# Patient Record
Sex: MALE | Race: WHITE | HISPANIC OR LATINO | Employment: FULL TIME | URBAN - METROPOLITAN AREA
[De-identification: names, ages, dates, MRNs, and addresses within clinical notes are randomized per-mention and may not be internally consistent; named-entity substitution may affect disease eponyms.]

---

## 2023-04-07 LAB — SARS-COV-2 RESULT: NOT DETECTED

## 2023-12-15 ENCOUNTER — HOSPITAL ENCOUNTER (OUTPATIENT)
Dept: NEUROLOGY | Facility: HOSPITAL | Age: 33
Discharge: HOME | End: 2023-12-15
Payer: COMMERCIAL

## 2023-12-15 DIAGNOSIS — G56.00 CARPAL TUNNEL SYNDROME, UNSPECIFIED UPPER LIMB: ICD-10-CM

## 2023-12-15 PROCEDURE — 95911 NRV CNDJ TEST 9-10 STUDIES: CPT

## 2023-12-17 NOTE — PROCEDURES
Electromyography Laboratory Report       Accreditation with Exemplary Status         Name VAZQUEZ FLEMING MRN [id number] Ref Provider DALILA GARCIA Technologist Fred   Gender Male Age 33 Years EDX Physician Lena Steinp ºC 33    1990 Height 5 feet 9 inch Order No [id number] Study Date 12/15/2023 11:11 AM      Reason for Referral:   Carpal Tunnel Syndrome (CTS),     VAZQUEZ FLEMING [id number] 12/15/2023 11:11 AM     2 of 3        Motor Nerve Conduction Study       Nerve/Sites Muscle Amplitude Latency Velocity F min     mV ms m/s ms     Right Left Ref. Right Left Ref. Right Left Ref. Right Left Ref.   Median - APB      Wrist APB 12.4 14.4 >=6.0 6.6 5.5 <=3.9    31.2 30.9 <=30.9      AC Fossa APB 11.0 13.8  11.5 9.8  49.8 53.3 >=50.0      Ulnar - ADM      Wrist ADM 14.1  >=7.0 3.1  <=3.1    28.1  <=30.2      B. Elbow ADM 12.8   6.4   57.0  >=50.0         A. Elbow ADM 13.9   8.1   58.5  >=50.0          Sensory Nerve Conduction Study       Nerve/Sites Rec. Site Amplitude Peak Lat Velocity     µV ms m/s     Right Left Ref. Right Left Ref. Right Left Ref.   Median      Wrist Index 9.6 18.8 >=20.0 6.3 5.1 <=3.4 33.0 40.1 >=50.0   Ulnar      Wrist Little 28.1  >=12.0 3.9  <=3.1 48.9  >=50.0   Radial      Forearm Snuff Box 16.0  >=18.0 2.7  <=2.7 54.9  >=50.0   Median, Ulnar - Palmar Mixed      Median  Palm Med Wr 10.6 10.0  4.0 3.2 <=2.2 25.6 30.7 >=49.0      Ulnar Palm Uln Wr 23.5   1.9  <=2.2 56.9  >=49.0                      EMG Summary Table     Spontaneous Voluntary Activity   Muscle Nerve Roots Ins Act Fibs/PSW Fasc Other Amp Dur Phases Recruitment Activation Notes   R. APB - Abd Poll Brev Median C8-T1 NL 0 0 - NL +2 NL Early NL -   R. PT - Pron Teres Median C6-C7 NL 0 0 - NL NL NL NL NL -   R. FDI - First Dors Int Ulnar C8-T1 NL 0 0 - NL NL NL NL NL -   R. FCU - Flex Carp Uln Ulnar C7-T1 NL 0 0 - NL NL NL NL NL -   R. MD - Deltoid (med) Axillary C5-C6 NL 0 0 - NL NL NL NL NL -   R. BB - Biceps Brach Musculocutaneous  C5-C6 NL 0 0 - NL NL NL NL NL -   R. TB - Triceps Brach Radial C6-C8 NL 0 0 - NL NL NL NL NL -   R. Cervical psp (low)  - NL 0 0 - NL NL NL NL NL -   R. Cervical psp (mid)  - NL 0 0 - NL NL NL NL NL -   R. Cervical psp (up)  - NL 0 0 - NL NL NL NL NL -   L. APB - Abd Poll Brev Median C8-T1 NL 0 0 - -1 +1 NL NL NL -   L. PT - Pron Teres Median C6-C7 NL 0 0 - NL NL NL NL NL -   L. FDI - First Dors Int Ulnar C8-T1 NL 0 0 - NL NL NL NL NL -   L. FCU - Flex Carp Uln Ulnar C7-T1 NL 0 0 - NL NL NL NL NL -   L. MD - Deltoid (med) Axillary C5-C6 NL 0 0 - NL NL NL NL NL -   L. BB - Biceps Brach Musculocutaneous C5-C6 NL 0 0 - NL NL NL NL NL -   L. TB - Triceps Brach Radial C6-C8 NL 0 0 - NL NL NL NL NL -   L. Cervical psp (low)  - NL 0 0 - NL NL NL NL NL -   L. Cervical psp (mid)  - NL 0 0 - NL NL NL NL NL -   L. Cervical psp (up)  - NL 0 0 - NL NL NL NL NL -               NL = Normal, Inc = Increased, Dec = Decreased, CRD = Complex Repetitive Discharge; SL = Slightly Decreased; MO = Moderately Decreased; MK = Markedly Decreased; N/+1, +1, +2, +3 = Borderline, Slightly, Moderately, Markedly Increased; N/-1, -1, -2, -3 = Borderline, Slightly, Moderately, Markedly Decreased, N/A = Not Applicable    Summary:         Impression:   This study reveals ENMG evidence of a neuropathic process affecting the median nerves at or below the wrists bilaterally, consistent with the clinical diagnosis of bilateral carpal tunnel syndrome. This is of moderate degree on the R and mild degree on the L by electrical criteria.   Nocturnal splint use was recommended to the patient.   There is no suggestion by this study of more proximal processes e.g. radiculopathy, nor of more widespread processes e.g. peripheral neuropathy or mononeuritis multiplex.

## 2023-12-22 ENCOUNTER — OFFICE VISIT (OUTPATIENT)
Dept: ORTHOPEDIC SURGERY | Facility: CLINIC | Age: 33
End: 2023-12-22
Payer: COMMERCIAL

## 2023-12-22 VITALS — BODY MASS INDEX: 37.77 KG/M2 | WEIGHT: 255 LBS | HEIGHT: 69 IN

## 2023-12-22 DIAGNOSIS — G56.03 BILATERAL CARPAL TUNNEL SYNDROME: Primary | ICD-10-CM

## 2023-12-22 PROCEDURE — 3008F BODY MASS INDEX DOCD: CPT | Performed by: STUDENT IN AN ORGANIZED HEALTH CARE EDUCATION/TRAINING PROGRAM

## 2023-12-22 PROCEDURE — 2500000004 HC RX 250 GENERAL PHARMACY W/ HCPCS (ALT 636 FOR OP/ED): Performed by: STUDENT IN AN ORGANIZED HEALTH CARE EDUCATION/TRAINING PROGRAM

## 2023-12-22 PROCEDURE — 99214 OFFICE O/P EST MOD 30 MIN: CPT | Performed by: STUDENT IN AN ORGANIZED HEALTH CARE EDUCATION/TRAINING PROGRAM

## 2023-12-22 PROCEDURE — 2500000005 HC RX 250 GENERAL PHARMACY W/O HCPCS: Performed by: STUDENT IN AN ORGANIZED HEALTH CARE EDUCATION/TRAINING PROGRAM

## 2023-12-22 RX ORDER — MONTELUKAST SODIUM 10 MG/1
10 TABLET ORAL NIGHTLY
COMMUNITY
Start: 2023-12-08

## 2023-12-22 RX ORDER — ALBUTEROL SULFATE 0.83 MG/ML
2.5 SOLUTION RESPIRATORY (INHALATION) EVERY 6 HOURS PRN
COMMUNITY
Start: 2023-12-07

## 2023-12-22 RX ORDER — FLUTICASONE PROPIONATE 110 UG/1
1 AEROSOL, METERED RESPIRATORY (INHALATION) 2 TIMES DAILY
COMMUNITY
Start: 2023-12-01

## 2023-12-22 RX ORDER — BETAMETHASONE SODIUM PHOSPHATE AND BETAMETHASONE ACETATE 3; 3 MG/ML; MG/ML
6 INJECTION, SUSPENSION INTRA-ARTICULAR; INTRALESIONAL; INTRAMUSCULAR; SOFT TISSUE
Status: COMPLETED | OUTPATIENT
Start: 2023-12-22 | End: 2023-12-22

## 2023-12-22 RX ORDER — LIDOCAINE HYDROCHLORIDE 10 MG/ML
1 INJECTION INFILTRATION; PERINEURAL
Status: COMPLETED | OUTPATIENT
Start: 2023-12-22 | End: 2023-12-22

## 2023-12-22 RX ADMIN — BETAMETHASONE ACETATE AND BETAMETHASONE SODIUM PHOSPHATE 6 MG: 3; 3 INJECTION, SUSPENSION INTRA-ARTICULAR; INTRALESIONAL; INTRAMUSCULAR; SOFT TISSUE at 12:53

## 2023-12-22 RX ADMIN — LIDOCAINE HYDROCHLORIDE 1 ML: 10 INJECTION, SOLUTION INFILTRATION; PERINEURAL at 12:53

## 2023-12-22 ASSESSMENT — PAIN - FUNCTIONAL ASSESSMENT: PAIN_FUNCTIONAL_ASSESSMENT: 0-10

## 2023-12-22 ASSESSMENT — PAIN SCALES - GENERAL: PAINLEVEL_OUTOF10: 0 - NO PAIN

## 2023-12-22 NOTE — PROGRESS NOTES
History of Present Illness:  The patient presents today with known bilateral carpal tunnel syndrome.  The patient endorses numbness and tingling (predominantly radial three digits).  There is no current neck pain or cervical spine issues.  The patient has tried to the following interventions thus far: Nighttime neutral bracing, injections.  The patient notes the pain is worse with activity and at night.  The patient denies any recent trauma.  The pain is sharp, electrical in nature.    Patient presents today for discussion of EMG results as well as in hopes of obtaining another injection.  He is not interested in open release at this point time as he is cannot take any time off work    Review of Systems   GENERAL: Negative for malaise, significant weight loss, fever  MUSCULOSKELETAL: see HPI  NEURO:  Negative    Physical Examination:  No tenderness to palpation cervical spine  Good ROM of neck  Negative Spurlings test    Bilateral wrist/hand:  No obvious swelling or masses  Thenar eminence muscle mass atrophy: absent  No atrophy noted of hypothenar eminence   Equivocal Tinel's at carpal tunnel  + Median nerve compression test  + Phalen's test  Decreased sensation to light touch and 2 point discrimination in median nerve distribution  Motor exam within normal limits  Good capillary refill    M Inj/Asp on 12/22/2023 12:53 PM  Indications: pain  Details: 25 G needle, volar approach  Medications: 6 mg betamethasone acet,sod phos 6 mg/mL; 1 mL lidocaine 10 mg/mL (1 %)      Right carpal tunnel injection: It was explained to the patient that the risk of steroid injection include but are not limited to infection, local skin irritation, skin atrophy, calcification, continued pain and discomfort, elevation of blood sugar, burning, failure to relieve pain, possible late infection.  Patient verbalized good insight and verbalized consent for the injection.  It was further explained that postinjection discomfort can be  alleviated with additional medications, ice, elevation and rest for over the next 24 hours.    Using aseptic technique, a solution containing 1cc of Celestone and 1 mL of 1% lidocaine without epinephrine was injected into the patient's carpal tunnel.  A 25-gauge needle was inserted just ulnar to the anatomic course of the palmaris longus tendon at the level of the distal wrist flexion crease.  It was slowly advanced deep to the distal antebrachial fascia.  Solution was injected slowly taking care to ensure that the patient experienced no paresthesias during the injection of solution.  After injection of the solution the patient was asked to perform active flexion extension of the wrist and digits to help disperse the solution.  A Band-Aid was then placed at the injection site.  Patient tolerated this injection well it should be noted that ethyl chloride spray was used to make injection delivery more comfortable for the patient.      Assessment:  Patient with bilateral carpal tunnel syndrome    Plan:  We reviewed the disease process with the patient.  We discussed the role for electrodiagnostic testing, immobilization, and injections.  We discussed surgical intervention reviewing the benefits and potential risks (neurologic injury / recurrent motor branch, wound issues including infection, pillar pain, and recurrence).  Various anesthetic options were discussed (sedation, wide awake local, linda block).  The anticipated recovery and return to activity was also discussed.  The patient elected for: Cortisone injection right carpal tunnel     Discussed with patient that if he has continued symptoms and is not improving I do want him to follow-up with Dr. Melba Contreras MD for discussion of open carpal tunnel release

## 2024-06-20 ENCOUNTER — HOSPITAL ENCOUNTER (EMERGENCY)
Facility: HOSPITAL | Age: 34
Discharge: HOME | End: 2024-06-20
Payer: COMMERCIAL

## 2024-06-20 ENCOUNTER — APPOINTMENT (OUTPATIENT)
Dept: RADIOLOGY | Facility: HOSPITAL | Age: 34
End: 2024-06-20
Payer: COMMERCIAL

## 2024-06-20 ENCOUNTER — APPOINTMENT (OUTPATIENT)
Dept: CARDIOLOGY | Facility: HOSPITAL | Age: 34
End: 2024-06-20
Payer: COMMERCIAL

## 2024-06-20 VITALS
HEIGHT: 69 IN | WEIGHT: 250 LBS | RESPIRATION RATE: 18 BRPM | TEMPERATURE: 98.8 F | DIASTOLIC BLOOD PRESSURE: 83 MMHG | SYSTOLIC BLOOD PRESSURE: 150 MMHG | OXYGEN SATURATION: 100 % | HEART RATE: 64 BPM | BODY MASS INDEX: 37.03 KG/M2

## 2024-06-20 DIAGNOSIS — R06.00 DYSPNEA, UNSPECIFIED TYPE: Primary | ICD-10-CM

## 2024-06-20 DIAGNOSIS — M62.838 MUSCLE SPASM: ICD-10-CM

## 2024-06-20 DIAGNOSIS — J45.21 MILD INTERMITTENT ASTHMA WITH EXACERBATION (HHS-HCC): ICD-10-CM

## 2024-06-20 LAB
ALBUMIN SERPL BCP-MCNC: 4.7 G/DL (ref 3.4–5)
ALP SERPL-CCNC: 63 U/L (ref 33–120)
ALT SERPL W P-5'-P-CCNC: 28 U/L (ref 10–52)
ANION GAP SERPL CALC-SCNC: 13 MMOL/L (ref 10–20)
AST SERPL W P-5'-P-CCNC: 21 U/L (ref 9–39)
BASOPHILS # BLD AUTO: 0.07 X10*3/UL (ref 0–0.1)
BASOPHILS NFR BLD AUTO: 0.8 %
BILIRUB SERPL-MCNC: 0.7 MG/DL (ref 0–1.2)
BNP SERPL-MCNC: 8 PG/ML (ref 0–99)
BUN SERPL-MCNC: 14 MG/DL (ref 6–23)
CALCIUM SERPL-MCNC: 9.3 MG/DL (ref 8.6–10.3)
CARDIAC TROPONIN I PNL SERPL HS: 3 NG/L (ref 0–20)
CARDIAC TROPONIN I PNL SERPL HS: <3 NG/L (ref 0–20)
CHLORIDE SERPL-SCNC: 106 MMOL/L (ref 98–107)
CO2 SERPL-SCNC: 23 MMOL/L (ref 21–32)
CREAT SERPL-MCNC: 1.08 MG/DL (ref 0.5–1.3)
EGFRCR SERPLBLD CKD-EPI 2021: >90 ML/MIN/1.73M*2
EOSINOPHIL # BLD AUTO: 0.12 X10*3/UL (ref 0–0.7)
EOSINOPHIL NFR BLD AUTO: 1.4 %
ERYTHROCYTE [DISTWIDTH] IN BLOOD BY AUTOMATED COUNT: 13.2 % (ref 11.5–14.5)
GLUCOSE SERPL-MCNC: 86 MG/DL (ref 74–99)
HCT VFR BLD AUTO: 46.8 % (ref 41–52)
HGB BLD-MCNC: 16.2 G/DL (ref 13.5–17.5)
IMM GRANULOCYTES # BLD AUTO: 0.03 X10*3/UL (ref 0–0.7)
IMM GRANULOCYTES NFR BLD AUTO: 0.4 % (ref 0–0.9)
LACTATE SERPL-SCNC: 1.2 MMOL/L (ref 0.4–2)
LYMPHOCYTES # BLD AUTO: 1.63 X10*3/UL (ref 1.2–4.8)
LYMPHOCYTES NFR BLD AUTO: 19.1 %
MCH RBC QN AUTO: 27.7 PG (ref 26–34)
MCHC RBC AUTO-ENTMCNC: 34.6 G/DL (ref 32–36)
MCV RBC AUTO: 80 FL (ref 80–100)
MONOCYTES # BLD AUTO: 0.45 X10*3/UL (ref 0.1–1)
MONOCYTES NFR BLD AUTO: 5.3 %
NEUTROPHILS # BLD AUTO: 6.25 X10*3/UL (ref 1.2–7.7)
NEUTROPHILS NFR BLD AUTO: 73 %
NRBC BLD-RTO: 0 /100 WBCS (ref 0–0)
PLATELET # BLD AUTO: 371 X10*3/UL (ref 150–450)
POTASSIUM SERPL-SCNC: 4 MMOL/L (ref 3.5–5.3)
PROT SERPL-MCNC: 7.8 G/DL (ref 6.4–8.2)
RBC # BLD AUTO: 5.84 X10*6/UL (ref 4.5–5.9)
SARS-COV-2 RNA RESP QL NAA+PROBE: NOT DETECTED
SODIUM SERPL-SCNC: 138 MMOL/L (ref 136–145)
WBC # BLD AUTO: 8.6 X10*3/UL (ref 4.4–11.3)

## 2024-06-20 PROCEDURE — 84484 ASSAY OF TROPONIN QUANT: CPT | Performed by: NURSE PRACTITIONER

## 2024-06-20 PROCEDURE — 99284 EMERGENCY DEPT VISIT MOD MDM: CPT | Mod: 25

## 2024-06-20 PROCEDURE — 2500000004 HC RX 250 GENERAL PHARMACY W/ HCPCS (ALT 636 FOR OP/ED): Performed by: NURSE PRACTITIONER

## 2024-06-20 PROCEDURE — 36415 COLL VENOUS BLD VENIPUNCTURE: CPT | Performed by: NURSE PRACTITIONER

## 2024-06-20 PROCEDURE — 80053 COMPREHEN METABOLIC PANEL: CPT | Performed by: NURSE PRACTITIONER

## 2024-06-20 PROCEDURE — 93005 ELECTROCARDIOGRAM TRACING: CPT

## 2024-06-20 PROCEDURE — 83880 ASSAY OF NATRIURETIC PEPTIDE: CPT | Performed by: NURSE PRACTITIONER

## 2024-06-20 PROCEDURE — 71046 X-RAY EXAM CHEST 2 VIEWS: CPT

## 2024-06-20 PROCEDURE — 96374 THER/PROPH/DIAG INJ IV PUSH: CPT

## 2024-06-20 PROCEDURE — 71046 X-RAY EXAM CHEST 2 VIEWS: CPT | Mod: FOREIGN READ | Performed by: RADIOLOGY

## 2024-06-20 PROCEDURE — 85025 COMPLETE CBC W/AUTO DIFF WBC: CPT | Performed by: NURSE PRACTITIONER

## 2024-06-20 PROCEDURE — 96375 TX/PRO/DX INJ NEW DRUG ADDON: CPT

## 2024-06-20 PROCEDURE — 87635 SARS-COV-2 COVID-19 AMP PRB: CPT | Performed by: NURSE PRACTITIONER

## 2024-06-20 PROCEDURE — 83605 ASSAY OF LACTIC ACID: CPT | Performed by: NURSE PRACTITIONER

## 2024-06-20 RX ORDER — KETOROLAC TROMETHAMINE 30 MG/ML
30 INJECTION, SOLUTION INTRAMUSCULAR; INTRAVENOUS ONCE
Status: COMPLETED | OUTPATIENT
Start: 2024-06-20 | End: 2024-06-20

## 2024-06-20 RX ORDER — ALBUTEROL SULFATE 90 UG/1
2 AEROSOL, METERED RESPIRATORY (INHALATION) EVERY 4 HOURS PRN
Qty: 18 G | Refills: 11 | Status: SHIPPED | OUTPATIENT
Start: 2024-06-20 | End: 2024-07-20

## 2024-06-20 RX ORDER — LORATADINE 10 MG/1
10 TABLET ORAL DAILY
Qty: 90 TABLET | Refills: 0 | Status: SHIPPED | OUTPATIENT
Start: 2024-06-20 | End: 2024-07-10

## 2024-06-20 RX ORDER — ORPHENADRINE CITRATE 30 MG/ML
60 INJECTION INTRAMUSCULAR; INTRAVENOUS ONCE
Status: COMPLETED | OUTPATIENT
Start: 2024-06-20 | End: 2024-06-20

## 2024-06-20 RX ORDER — TIZANIDINE 4 MG/1
4 TABLET ORAL EVERY 6 HOURS PRN
Qty: 30 TABLET | Refills: 0 | Status: SHIPPED | OUTPATIENT
Start: 2024-06-20 | End: 2024-06-30

## 2024-06-20 RX ADMIN — KETOROLAC TROMETHAMINE 30 MG: 30 INJECTION, SOLUTION INTRAMUSCULAR at 15:50

## 2024-06-20 RX ADMIN — SODIUM CHLORIDE 1000 ML: 9 INJECTION, SOLUTION INTRAVENOUS at 15:49

## 2024-06-20 RX ADMIN — ORPHENADRINE CITRATE 60 MG: 60 INJECTION INTRAMUSCULAR; INTRAVENOUS at 15:50

## 2024-06-20 ASSESSMENT — LIFESTYLE VARIABLES
EVER FELT BAD OR GUILTY ABOUT YOUR DRINKING: NO
HAVE PEOPLE ANNOYED YOU BY CRITICIZING YOUR DRINKING: NO
TOTAL SCORE: 0
EVER HAD A DRINK FIRST THING IN THE MORNING TO STEADY YOUR NERVES TO GET RID OF A HANGOVER: NO
HAVE YOU EVER FELT YOU SHOULD CUT DOWN ON YOUR DRINKING: NO

## 2024-06-20 ASSESSMENT — PAIN - FUNCTIONAL ASSESSMENT
PAIN_FUNCTIONAL_ASSESSMENT: 0-10
PAIN_FUNCTIONAL_ASSESSMENT: 0-10

## 2024-06-20 ASSESSMENT — PAIN SCALES - GENERAL
PAINLEVEL_OUTOF10: 5 - MODERATE PAIN

## 2024-06-20 ASSESSMENT — PAIN DESCRIPTION - PAIN TYPE: TYPE: ACUTE PAIN

## 2024-06-20 ASSESSMENT — PAIN DESCRIPTION - DESCRIPTORS
DESCRIPTORS: TIGHTNESS
DESCRIPTORS: TIGHTNESS

## 2024-06-20 ASSESSMENT — PAIN DESCRIPTION - LOCATION
LOCATION: CHEST
LOCATION: CHEST

## 2024-06-20 ASSESSMENT — PAIN DESCRIPTION - ORIENTATION
ORIENTATION: LEFT
ORIENTATION: LEFT

## 2024-06-20 ASSESSMENT — PAIN DESCRIPTION - FREQUENCY: FREQUENCY: CONSTANT/CONTINUOUS

## 2024-06-20 NOTE — ED PROVIDER NOTES
[facility]  ED  Encounter Note  Admit Date/RoomTime: 2024  2:45 PM  ED Room: [place] [address]  NAME: Ryan Samuels  : 1990  MRN: [id number]     Chief Complaint:  Pain With Breathing (Patient states it hurts him to take a deep breath. 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 Denies SOB, states he has no cough and hasn't been ill)    HISTORY OF PRESENT ILLNESS        Ryan Samuels is a 34 y.o. male who presents to the ED for evaluation of breathing issues. Hx of asthma and has been using rescue inhaler few times a day. 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 He does not think he is sick. Denied actual chest pain but feels unwell.     ROS   Pertinent positives and negatives are stated within HPI, all other systems reviewed and are negative.    Past Medical History:  has a past medical history of Anesthesia of skin, Asthma ([facility]), and Personal history of other diseases of the respiratory system.    Surgical History:  has a past surgical history that includes Other surgical history (2022).    Social History:  reports that he has never smoked. He has never used smokeless tobacco. He reports current drug use.    Family History: family history is not on file.     Allergies: Patient has no known allergies.    PHYSICAL EXAM   Oxygen Saturation Interpretation: Normal.       Physical Exam  Constitutional/General: Alert and oriented x3, well appearing, non toxic  HEENT:  NC/NT. PERRLA.  Airway patent.  Neck: Supple, full ROM. No midline vertebral tenderness or crepitus.   Respiratory: Lung sounds clear to auscultation bilaterally. No wheezes, rhonchi or stridor. Not in respiratory distress.  CV:  Regular rate. Regular rhythm. No murmurs or rubs. 2+ distal pulses.  GI:  Abdomen soft, non-tender, non-distended. +BS. No rebound, guarding, or rigidity. No pulsatile masses.  Musculoskeletal: Moves all extremities x  4. Warm and well perfused. Capillary refill <3 seconds  Integument: Skin warm and dry. No rashes.   Neurologic: Alert and oriented with no focal deficits, symmetric strength 5/5 in the upper and lower extremities bilaterally.  Psychiatric: Normal affect.    Lab / Imaging Results   (All laboratory and radiology results have been personally reviewed by myself)  Labs:  Results for orders placed or performed during the hospital encounter of 06/20/24   CBC and Auto Differential   Result Value Ref Range    WBC 8.6 4.4 - 11.3 x10*3/uL    nRBC 0.0 0.0 - 0.0 /100 WBCs    RBC 5.84 4.50 - 5.90 x10*6/uL    Hemoglobin 16.2 13.5 - 17.5 g/dL    Hematocrit 46.8 41.0 - 52.0 %    MCV 80 80 - 100 fL    MCH 27.7 26.0 - 34.0 pg    MCHC 34.6 32.0 - 36.0 g/dL    RDW 13.2 11.5 - 14.5 %    Platelets 371 150 - 450 x10*3/uL    Neutrophils % 73.0 40.0 - 80.0 %    Immature Granulocytes %, Automated 0.4 0.0 - 0.9 %    Lymphocytes % 19.1 13.0 - 44.0 %    Monocytes % 5.3 2.0 - 10.0 %    Eosinophils % 1.4 0.0 - 6.0 %    Basophils % 0.8 0.0 - 2.0 %    Neutrophils Absolute 6.25 1.20 - 7.70 x10*3/uL    Immature Granulocytes Absolute, Automated 0.03 0.00 - 0.70 x10*3/uL    Lymphocytes Absolute 1.63 1.20 - 4.80 x10*3/uL    Monocytes Absolute 0.45 0.10 - 1.00 x10*3/uL    Eosinophils Absolute 0.12 0.00 - 0.70 x10*3/uL    Basophils Absolute 0.07 0.00 - 0.10 x10*3/uL   Comprehensive metabolic panel   Result Value Ref Range    Glucose 86 74 - 99 mg/dL    Sodium 138 136 - 145 mmol/L    Potassium 4.0 3.5 - 5.3 mmol/L    Chloride 106 98 - 107 mmol/L    Bicarbonate 23 21 - 32 mmol/L    Anion Gap 13 10 - 20 mmol/L    Urea Nitrogen 14 6 - 23 mg/dL    Creatinine 1.08 0.50 - 1.30 mg/dL    eGFR >90 >60 mL/min/1.73m*2    Calcium 9.3 8.6 - 10.3 mg/dL    Albumin 4.7 3.4 - 5.0 g/dL    Alkaline Phosphatase 63 33 - 120 U/L    Total Protein 7.8 6.4 - 8.2 g/dL    AST 21 9 - 39 U/L    Bilirubin, Total 0.7 0.0 - 1.2 mg/dL    ALT 28 10 - 52 U/L   Lactate   Result Value Ref  Range    Lactate 1.2 0.4 - 2.0 mmol/L   B-Type Natriuretic Peptide   Result Value Ref Range    BNP 8 0 - 99 pg/mL   Sars-CoV-2 PCR   Result Value Ref Range    Coronavirus 2019, PCR Not Detected Not Detected   Troponin I, High Sensitivity, Initial   Result Value Ref Range    Troponin I, High Sensitivity <3 0 - 20 ng/L   Troponin, High Sensitivity, 1 Hour   Result Value Ref Range    Troponin I, High Sensitivity 3 0 - 20 ng/L   ECG 12 lead   Result Value Ref Range    Ventricular Rate 66 BPM    Atrial Rate 66 BPM    TX Interval 130 ms    QRS Duration 100 ms    QT Interval 404 ms    QTC Calculation(Bazett) 423 ms    P Axis 66 degrees    R Axis 37 degrees    T Axis 34 degrees    QRS Count 11 beats    Q Onset 223 ms    P Onset 158 ms    P Offset 197 ms    T Offset 425 ms    QTC Fredericia 417 ms     Imaging:  All Radiology results interpreted by Radiologist unless otherwise noted.  XR chest 2 views   Final Result   No acute cardiopulmonary disease.   Signed by Yfn Lara MD          ED Course / Medical Decision Making     Medications   sodium chloride 0.9 % bolus 1,000 mL (0 mL intravenous Stopped 6/20/24 1619)   ketorolac (Toradol) injection 30 mg (30 mg intravenous Given 6/20/24 1550)   orphenadrine (Norflex) injection 60 mg (60 mg intravenous Given 6/20/24 1550)     ED Course as of 06/20/24 1922   Thu Jun 20, 2024   1517 Rate 66bpm pr inerval 130 ms qrs duratin 100 ms qt qtc 404/423 ms prt axes 66 37 34 normal rate rhythm axis persnally reviewed by me [PK]      ED Course User Index  [PK] SHYANNE Sharma-CNP         Diagnoses as of 06/20/24 1922   Dyspnea, unspecified type   Mild intermittent asthma with exacerbation ([facility])   Muscle spasm     Re-examination:    Patient’s condition gradually improving.    Consult(s):   None        MDM:       Ryan Samuels is a 34 y.o. male who presents to the ED for evaluation of breathing issues. Hx of asthma and has been using rescue inhaler few times a day. 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 He does not think he is sick. Denied actual chest pain but feels unwell.     ED course stable  Felt better with fluids.  Will send in inhaler claritin as well as muscle relaxers. He is off tomorrow; encourage fluids and rest.  Return if needed.  Covid neg tn x 2 negative - 3 and 3  Bnp 8  Lactate 1.2  Cbc cmp wnl  Chest xray showed no acute changes.  Discussed could be heat vs mild asthma exac. He is taking his other asthma meds singular etc.  Return if needed.  Followup with PCP in one week  Not on bp meds and HR 67.Pulse o 100%. Low concern for pe.   Ddx: dehydration dyspnea     Plan of Care/Counseling:  I reviewed today's visit with the patient and spouse in addition to providing specific details for the plan of care and counseling regarding the diagnosis and prognosis.  Questions are answered at this time and are agreeable with the plan.    ASSESSMENT     1. Dyspnea, unspecified type    2. Mild intermittent asthma with exacerbation ([facility])    3. Muscle spasm      PLAN   Home Advised to return for signs of head injury, weakness, numbness or tingling to extremities, incontinence  Diagnostic tests were reviewed and questions answered. Diagnosis, care plan and treatment options were discussed. The patient and spouse/SO understand instructions and will follow up as directed.  Condition stable  The patient and spouse was given verbal follow-up instructions  Patient condition is good    New Medications     New Medications Ordered This Visit   Medications    sodium chloride 0.9 % bolus 1,000 mL    ketorolac (Toradol) injection 30 mg    orphenadrine (Norflex) injection 60 mg    albuterol 90 mcg/actuation inhaler     Sig: Inhale 2 puffs every 4 hours if needed for wheezing.     Dispense:  18 g     Refill:  11     Whatever covered by insurance    loratadine (Claritin) 10 mg tablet     Sig: Take 1 tablet (10 mg) by mouth once daily  for 20 days.     Dispense:  90 tablet     Refill:  0     Whatever covered by insurance    tiZANidine (Zanaflex) 4 mg tablet     Sig: Take 1 tablet (4 mg) by mouth every 6 hours if needed for muscle spasms for up to 10 days.     Dispense:  30 tablet     Refill:  0     Electronically signed by MADDIE Sharma   **This report was transcribed using voice recognition software. Every effort was made to ensure accuracy; however, inadvertent computerized transcription errors may be present.  END OF ED PROVIDER NOTE     MADDIE Sharma  06/20/24 1922

## 2024-06-20 NOTE — Clinical Note
Ryan Samuels was seen and treated in our emergency department on 6/20/2024.  He may return to work on 06/25/2024.       If you have any questions or concerns, please don't hesitate to call.      Karyn Stroud, SHYANNE-CNP

## 2024-06-20 NOTE — DISCHARGE INSTRUCTIONS
Rest next few days  Muscle relaxers if needed  Plenty of fluids  See primary care doctor in the next one week  Return if needed

## 2024-06-22 LAB
ATRIAL RATE: 66 BPM
P AXIS: 66 DEGREES
P OFFSET: 197 MS
P ONSET: 158 MS
PR INTERVAL: 130 MS
Q ONSET: 223 MS
QRS COUNT: 11 BEATS
QRS DURATION: 100 MS
QT INTERVAL: 404 MS
QTC CALCULATION(BAZETT): 423 MS
QTC FREDERICIA: 417 MS
R AXIS: 37 DEGREES
T AXIS: 34 DEGREES
T OFFSET: 425 MS
VENTRICULAR RATE: 66 BPM

## 2025-01-28 ENCOUNTER — HOSPITAL ENCOUNTER (EMERGENCY)
Facility: HOSPITAL | Age: 35
Discharge: HOME | End: 2025-01-28
Payer: COMMERCIAL

## 2025-01-28 VITALS
WEIGHT: 255 LBS | SYSTOLIC BLOOD PRESSURE: 154 MMHG | DIASTOLIC BLOOD PRESSURE: 107 MMHG | HEIGHT: 69 IN | TEMPERATURE: 98.6 F | OXYGEN SATURATION: 97 % | RESPIRATION RATE: 18 BRPM | BODY MASS INDEX: 37.77 KG/M2 | HEART RATE: 97 BPM

## 2025-01-28 DIAGNOSIS — U07.1 COVID-19: Primary | ICD-10-CM

## 2025-01-28 LAB
FLUAV RNA RESP QL NAA+PROBE: NOT DETECTED
FLUBV RNA RESP QL NAA+PROBE: NOT DETECTED
SARS-COV-2 RNA RESP QL NAA+PROBE: DETECTED

## 2025-01-28 PROCEDURE — 99283 EMERGENCY DEPT VISIT LOW MDM: CPT

## 2025-01-28 PROCEDURE — 87636 SARSCOV2 & INF A&B AMP PRB: CPT | Performed by: NURSE PRACTITIONER

## 2025-01-28 RX ORDER — BENZONATATE 100 MG/1
200 CAPSULE ORAL 3 TIMES DAILY PRN
Qty: 9 CAPSULE | Refills: 0 | Status: SHIPPED | OUTPATIENT
Start: 2025-01-28

## 2025-01-28 RX ORDER — ALBUTEROL SULFATE 90 UG/1
1-2 INHALANT RESPIRATORY (INHALATION) EVERY 6 HOURS PRN
Qty: 6.7 G | Refills: 0 | Status: SHIPPED | OUTPATIENT
Start: 2025-01-28 | End: 2025-02-27

## 2025-01-28 RX ORDER — PREDNISONE 20 MG/1
40 TABLET ORAL DAILY
Qty: 10 TABLET | Refills: 0 | Status: SHIPPED | OUTPATIENT
Start: 2025-01-28 | End: 2025-02-02

## 2025-01-28 RX ORDER — DEXTROMETHORPHAN POLISTIREX 30 MG/5ML
60 SUSPENSION ORAL 2 TIMES DAILY
Qty: 89 ML | Refills: 0 | Status: SHIPPED | OUTPATIENT
Start: 2025-01-28

## 2025-01-28 ASSESSMENT — PAIN DESCRIPTION - LOCATION: LOCATION: GENERALIZED

## 2025-01-28 ASSESSMENT — COLUMBIA-SUICIDE SEVERITY RATING SCALE - C-SSRS
1. IN THE PAST MONTH, HAVE YOU WISHED YOU WERE DEAD OR WISHED YOU COULD GO TO SLEEP AND NOT WAKE UP?: NO
2. HAVE YOU ACTUALLY HAD ANY THOUGHTS OF KILLING YOURSELF?: NO
6. HAVE YOU EVER DONE ANYTHING, STARTED TO DO ANYTHING, OR PREPARED TO DO ANYTHING TO END YOUR LIFE?: NO

## 2025-01-28 ASSESSMENT — PAIN DESCRIPTION - DESCRIPTORS: DESCRIPTORS: PRESSURE

## 2025-01-28 ASSESSMENT — PAIN DESCRIPTION - PAIN TYPE: TYPE: ACUTE PAIN

## 2025-01-28 ASSESSMENT — PAIN SCALES - GENERAL: PAINLEVEL_OUTOF10: 7

## 2025-01-28 ASSESSMENT — PAIN - FUNCTIONAL ASSESSMENT: PAIN_FUNCTIONAL_ASSESSMENT: 0-10

## 2025-01-28 ASSESSMENT — PAIN DESCRIPTION - FREQUENCY: FREQUENCY: CONSTANT/CONTINUOUS

## 2025-01-28 NOTE — Clinical Note
Ryan Samuels was seen and treated in our emergency department on 1/28/2025.  He may return to work on 02/03/2025.       If you have any questions or concerns, please don't hesitate to call.      Dorene Baker, APRN-CNP

## 2025-01-28 NOTE — ED PROVIDER NOTES
HPI   Chief Complaint   Patient presents with    Flu Symptoms     Cough, congestion, body pain, feel like crap and had to call off work       34-year-old male presents emergency department, states he has had cough, congestion, body aches, headache.  States he started with his symptoms on Sunday.  Does have history of asthma and has been using his inhaler, did use it prior to arrival.    States taking medications for cough at home without much improvement.      History provided by:  Patient   used: No            Patient History   Past Medical History:   Diagnosis Date    Anesthesia of skin     Numbness and tingling    Asthma ([facility])     Personal history of other diseases of the respiratory system     History of asthma     Past Surgical History:   Procedure Laterality Date    OTHER SURGICAL HISTORY  11/18/2022    Knee arthroscopy     No family history on file.  Social History     Tobacco Use    Smoking status: Never    Smokeless tobacco: Never   Substance Use Topics    Alcohol use: Not on file    Drug use: Yes       Physical Exam   ED Triage Vitals [01/28/25 0607]   Temperature Heart Rate Respirations BP   37 °C (98.6 °F) 97 18 (!) 154/107      Pulse Ox Temp Source Heart Rate Source Patient Position   97 % Temporal Monitor Sitting      BP Location FiO2 (%)     Right arm --       Physical Exam  Constitutional: Vitals noted, no distress. Afebrile.   Cardiovascular: Regular, rate, rhythm, no murmur.   Pulmonary: Lungs clear bilaterally with good aeration. No adventitious breath sounds.   Gastrointestinal: Soft, nonsurgical. Nontender. No peritoneal signs. Normoactive bowel sounds.   Musculoskeletal: No peripheral edema. Negative Homans bilaterally, no cords.   Skin: No rash.   Neuro: No focal neurologic deficits, NIH score of 0.      ED Course & MDM   Diagnoses as of 01/28/25 0704   COVID-19          Labs Reviewed   SARS-COV-2 AND INFLUENZA A/B PCR - Abnormal       Result Value    Flu A Result Not  Detected      Flu B Result Not Detected      Coronavirus 2019, PCR Detected (*)     Narrative:     This assay is an FDA-cleared, in vitro diagnostic nucleic acid amplification test for the qualitative detection and differentiation of SARS CoV-2/ Influenza A/B from nasopharyngeal specimens collected from individuals with signs and symptoms of respiratory tract infections, and has been validated for use at [facility]. Negative results do not preclude COVID-19/ Influenza A/B infections and should not be used as the sole basis for diagnosis, treatment, or other management decisions. Testing for SARS CoV-2 is recommended only for patients who meet current clinical and/or epidemiological criteria defined by federal, state, or local public health directives.        No orders to display            No data recorded     Hope Coma Scale Score: 15 (01/28/25 0607 : Phoebe Pandya RN)                   Patient presents with upper respiratory symptoms, concerning for COVID-19 and/or influenza.  Did test positive for COVID-19.    Discussed regimen of medications, including for cough, over-the-counter acetaminophen or ibuprofen for the body aches and fevers.  I did provide a refill of his albuterol inhaler and started on prednisone given his asthma history.  Discussed quarantine, work note provided through Monday.  Discussed closely monitoring his respiratory status, return with any worsening symptoms or any additional concerns.        Medical Decision Making      Procedure  Procedures     Dorene Baker, SHYANNE-CNP  01/28/25 0788

## 2025-06-20 ENCOUNTER — APPOINTMENT (OUTPATIENT)
Dept: RADIOLOGY | Facility: HOSPITAL | Age: 35
End: 2025-06-20
Payer: COMMERCIAL

## 2025-06-20 ENCOUNTER — HOSPITAL ENCOUNTER (EMERGENCY)
Facility: HOSPITAL | Age: 35
Discharge: HOME | End: 2025-06-20
Payer: COMMERCIAL

## 2025-06-20 VITALS
DIASTOLIC BLOOD PRESSURE: 83 MMHG | RESPIRATION RATE: 16 BRPM | BODY MASS INDEX: 37.03 KG/M2 | HEIGHT: 69 IN | OXYGEN SATURATION: 94 % | TEMPERATURE: 98.4 F | WEIGHT: 250 LBS | HEART RATE: 74 BPM | SYSTOLIC BLOOD PRESSURE: 153 MMHG

## 2025-06-20 DIAGNOSIS — M43.6 ACUTE TORTICOLLIS: Primary | ICD-10-CM

## 2025-06-20 PROCEDURE — 96372 THER/PROPH/DIAG INJ SC/IM: CPT | Performed by: PHYSICIAN ASSISTANT

## 2025-06-20 PROCEDURE — 72040 X-RAY EXAM NECK SPINE 2-3 VW: CPT

## 2025-06-20 PROCEDURE — 2500000001 HC RX 250 WO HCPCS SELF ADMINISTERED DRUGS (ALT 637 FOR MEDICARE OP): Performed by: PHYSICIAN ASSISTANT

## 2025-06-20 PROCEDURE — 99284 EMERGENCY DEPT VISIT MOD MDM: CPT

## 2025-06-20 PROCEDURE — 72050 X-RAY EXAM NECK SPINE 4/5VWS: CPT | Mod: RIGHT SIDE | Performed by: RADIOLOGY

## 2025-06-20 PROCEDURE — 2500000004 HC RX 250 GENERAL PHARMACY W/ HCPCS (ALT 636 FOR OP/ED): Mod: JW | Performed by: PHYSICIAN ASSISTANT

## 2025-06-20 PROCEDURE — 73030 X-RAY EXAM OF SHOULDER: CPT | Mod: RIGHT SIDE | Performed by: RADIOLOGY

## 2025-06-20 PROCEDURE — 73030 X-RAY EXAM OF SHOULDER: CPT | Mod: RT

## 2025-06-20 RX ORDER — OXYCODONE AND ACETAMINOPHEN 5; 325 MG/1; MG/1
1 TABLET ORAL ONCE
Refills: 0 | Status: COMPLETED | OUTPATIENT
Start: 2025-06-20 | End: 2025-06-20

## 2025-06-20 RX ORDER — KETOROLAC TROMETHAMINE 10 MG/1
10 TABLET, FILM COATED ORAL EVERY 6 HOURS PRN
Qty: 20 TABLET | Refills: 0 | Status: SHIPPED | OUTPATIENT
Start: 2025-06-20 | End: 2025-06-25

## 2025-06-20 RX ORDER — CYCLOBENZAPRINE HCL 10 MG
10 TABLET ORAL 3 TIMES DAILY PRN
Qty: 15 TABLET | Refills: 0 | Status: SHIPPED | OUTPATIENT
Start: 2025-06-20 | End: 2025-06-25

## 2025-06-20 RX ORDER — DIAZEPAM 5 MG/ML
5 INJECTION, SOLUTION INTRAMUSCULAR; INTRAVENOUS ONCE
Status: COMPLETED | OUTPATIENT
Start: 2025-06-20 | End: 2025-06-20

## 2025-06-20 RX ADMIN — OXYCODONE HYDROCHLORIDE AND ACETAMINOPHEN 1 TABLET: 5; 325 TABLET ORAL at 07:59

## 2025-06-20 RX ADMIN — DIAZEPAM 5 MG: 5 INJECTION INTRAMUSCULAR; INTRAVENOUS at 07:59

## 2025-06-20 ASSESSMENT — PAIN SCALES - GENERAL
PAINLEVEL_OUTOF10: 4
PAINLEVEL_OUTOF10: 8
PAINLEVEL_OUTOF10: 10 - WORST POSSIBLE PAIN

## 2025-06-20 ASSESSMENT — LIFESTYLE VARIABLES
HAVE YOU EVER FELT YOU SHOULD CUT DOWN ON YOUR DRINKING: NO
EVER HAD A DRINK FIRST THING IN THE MORNING TO STEADY YOUR NERVES TO GET RID OF A HANGOVER: NO
EVER FELT BAD OR GUILTY ABOUT YOUR DRINKING: NO
HAVE PEOPLE ANNOYED YOU BY CRITICIZING YOUR DRINKING: NO
TOTAL SCORE: 0

## 2025-06-20 ASSESSMENT — PAIN - FUNCTIONAL ASSESSMENT
PAIN_FUNCTIONAL_ASSESSMENT: 0-10

## 2025-06-20 ASSESSMENT — PAIN DESCRIPTION - ORIENTATION
ORIENTATION: RIGHT
ORIENTATION: RIGHT

## 2025-06-20 ASSESSMENT — PAIN DESCRIPTION - LOCATION
LOCATION: SHOULDER
LOCATION: SHOULDER

## 2025-06-20 NOTE — Clinical Note
Ryan Samuels was seen and treated in our emergency department on 6/20/2025.  He may return to work on 06/22/2025.       If you have any questions or concerns, please don't hesitate to call.      Jamil Cassidy PA-C

## 2025-06-20 NOTE — ED PROVIDER NOTES
HPI   Chief Complaint   Patient presents with    Shoulder Pain     Pt was cleaning the pool on Sunday and felt a pop while lifting overhead. Pain radiates into neck and has progressively gotten worse since. Pt a/ox4 from home. Pt cp/sob. No n/v. No fall/LOC/dizzy        35-year-old male patient comes in the emergency department today with complaints of right shoulder pain and neck pain.  States that started about a week ago when he was rolling up the pool cover when he felt a pop in his shoulder.  States this progressively worsened and now he has pain in his neck which is worse with movement.  Rates his pain a 10 on 10 on the pain scale.  States when he looks right where the pain is worse.  For this purpose comes in the emergency department today for the evaluation.  Otherwise no complaints present time.              Patient History   Medical History[1]  Surgical History[2]  Family History[3]  Social History[4]    Physical Exam   ED Triage Vitals [06/20/25 0658]   Temperature Heart Rate Respirations BP   36.3 °C (97.3 °F) 74 18 (!) 178/100      Pulse Ox Temp Source Heart Rate Source Patient Position   96 % Temporal Monitor --      BP Location FiO2 (%)     -- --       Physical Exam  Constitutional:       General: He is in acute distress.      Appearance: Normal appearance. He is not ill-appearing or diaphoretic.   HENT:      Head: Normocephalic and atraumatic.      Nose: Nose normal.   Eyes:      Extraocular Movements: Extraocular movements intact.      Conjunctiva/sclera: Conjunctivae normal.      Pupils: Pupils are equal, round, and reactive to light.   Cardiovascular:      Rate and Rhythm: Normal rate and regular rhythm.   Pulmonary:      Effort: Pulmonary effort is normal. No respiratory distress.      Breath sounds: Normal breath sounds. No stridor. No wheezing.   Musculoskeletal:         General: Tenderness (Right trapezius musculature, right paraspinal C-spine musculature.) present.      Cervical back: Normal  range of motion.      Comments: Decreased range of motion looking rightward as well as extension of the C-spine   Skin:     General: Skin is warm and dry.   Neurological:      General: No focal deficit present.      Mental Status: He is alert and oriented to person, place, and time. Mental status is at baseline.   Psychiatric:         Mood and Affect: Mood normal.           ED Course & MDM   Diagnoses as of 06/20/25 0859   Acute torticollis                 No data recorded     [place] Coma Scale Score: 15 (06/20/25 0659 : Phoebe Valenzuela RN)                           Medical Decision Making  35-year-old male patient comes in the emergency department today with complaints of right shoulder pain and neck pain.  States that started about a week ago when he was rolling up the pool cover when he felt a pop in his shoulder.  States this progressively worsened and now he has pain in his neck which is worse with movement.  Rates his pain a 10 on 10 on the pain scale.  States when he looks right where the pain is worse.  For this purpose comes in the emergency department today for the evaluation.  Otherwise no complaints present time.    X-ray right shoulder, C-spine ordered about any acute bony abnormalities, dislocations or subluxations.  IM Valium p.o. Percocet ordered for patient's pain for muscle relaxation.    Patient has negative right shoulder x-rays, C-spine shows some spondylosis with osteophytes.  Believe patient has torticollis based off clinical exam.  Will discharge patient home p.o. medications follow-up with orthopedics.  Will provide a work note.  Patient agrees with this plan and expressed verbal understanding.  Questions were answered.    Historian is the patient    Diagnosis: Acute torticollis    Labs Reviewed - No data to display     XR shoulder right 2+ views   Final Result   Normal appearance of the right shoulder.        Mild cervical spondylosis with anterior osteophytes at C4-5, C5-6,   and C6-7  levels.        Signed by: Marguerite Melvin 6/20/2025 8:54 AM   Dictation workstation:   [id number]      XR cervical spine 2-3 views   Final Result   Normal appearance of the right shoulder.        Mild cervical spondylosis with anterior osteophytes at C4-5, C5-6,   and C6-7 levels.        Signed by: Marguerite Melvin 6/20/2025 8:54 AM   Dictation workstation:   [id number]            Procedure  Procedures         [1]   Past Medical History:  Diagnosis Date    Anesthesia of skin     Numbness and tingling    Ast[facility] ([facility])     Personal history of other diseases of the respiratory system     History of asthma   [2]   Past Surgical History:  Procedure Laterality Date    OTHER SURGICAL HISTORY  11/18/2022    Knee arthroscopy   [3] No family history on file.  [4]   Social History  Tobacco Use    Smoking status: Never    Smokeless tobacco: Never   Substance Use Topics    Alcohol use: Not on file    Drug use: Yes        Jamil Cassidy PA-C  06/20/25 0859